# Patient Record
Sex: FEMALE | Race: WHITE
[De-identification: names, ages, dates, MRNs, and addresses within clinical notes are randomized per-mention and may not be internally consistent; named-entity substitution may affect disease eponyms.]

---

## 2017-11-28 ENCOUNTER — HOSPITAL ENCOUNTER (EMERGENCY)
Dept: HOSPITAL 25 - UCCORT | Age: 47
Discharge: HOME | End: 2017-11-28
Payer: COMMERCIAL

## 2017-11-28 VITALS — DIASTOLIC BLOOD PRESSURE: 86 MMHG | SYSTOLIC BLOOD PRESSURE: 120 MMHG

## 2017-11-28 DIAGNOSIS — J04.0: Primary | ICD-10-CM

## 2017-11-28 PROCEDURE — 99211 OFF/OP EST MAY X REQ PHY/QHP: CPT

## 2017-11-28 PROCEDURE — 87651 STREP A DNA AMP PROBE: CPT

## 2017-11-28 PROCEDURE — G0463 HOSPITAL OUTPT CLINIC VISIT: HCPCS

## 2017-11-28 NOTE — UC
Throat Pain/Nasal [place] HPI





- HPI Summary


HPI Summary: 





47 female presents to  with complaints of sore throat and hoarse voice x 2 

days. Patient also states she had had some nasal congestion, ear fullness and 

slight cough. Denies fever/chills, nausea and vomiting. Pain is worsened with 

swallowing. Denies sinus pressure and headache. Admits to feeling run down and 

fatigued. No trouble swallowing or difficulty breathing. No other complaints. 

No PMHx. Has not taken any medication. Has had symptoms like this before. 





- History of Current Complaint


Chief Complaint: UCGeneralIllness


Stated Complaint: ST


Time Seen by Provider: 11/28/17 17:37


Hx Obtained From: Patient


Hx Last Menstrual Period: 11/20/17


Onset/Duration: Sudden Onset, Lasting Days - 2, Still Present, Worse Since


Severity: Mild


Pain Intensity: 9


Pain Scale Used: 0-10 Numeric


Cough: None


Associated Signs & Symptoms: Positive: Dysphagia, Hoarseness





- Epiglottits Risk Factors


Epiglottis Risk Factors: Negative





- Allergies/Home Medications


Allergies/Adverse Reactions: 


 Allergies











Allergy/AdvReac Type Severity Reaction Status Date / Time


 


No Known Allergies Allergy   Verified 11/28/17 17:34














PMH/Surg Hx/FS Hx/Imm Hx





- Additional Past Medical History


Additional PMH: 





Denies PMHx. No DM or HTN





- Surgical History


Surgical History: Yes


Surgery Procedure, Year, and Place: 2 c-sections.  gallbladder





- Social History


Alcohol Use: None


Substance Use Type: None


Smoking Status (MU): Never Smoked Tobacco





- Immunization History


Most Recent Influenza Vaccination: none


Vaccination Up to Date: Yes





Review of Systems


Constitutional: Negative


ENT: Sore Throat, Ear Ache, Nasal Discharge


Respiratory: Cough - mild, dry non-productive


Cardiovascular: Negative


Gastrointestinal: Negative


Musculoskeletal: Negative


Neurological: Negative


All Other Systems Reviewed And Are Negative: Yes





Physical Exam


Triage Information Reviewed: Yes


Appearance: Well-Appearing, No Pain Distress


Vital Signs: 


 Initial Vital Signs











Temp  98.6 F   11/28/17 17:31


 


Pulse  81   11/28/17 17:31


 


Resp  16   11/28/17 17:31


 


BP  120/86   11/28/17 17:31


 


Pulse Ox  99   11/28/17 17:31











Vital Signs Reviewed: Yes


Eyes: Positive: Conjunctiva Clear


ENT: Positive: Hearing grossly normal, Pharyngeal erythema, TMs normal - serous 

effusion b/l, Hoarse voice, Uvula midline - no sign of peritonsillar abscess, 

airway patent.  Negative: Tonsillar swelling, Tonsillar exudate, Trismus, 

Muffled voice, Sinus tenderness


Dental: Negative: Percussion Tenderness @, Cervical Lymphadenopathy


Neck: Positive: Supple, Nontender, No Lymphadenopathy


Respiratory: Positive: Chest non-tender, Lungs clear, Normal breath sounds, No 

respiratory distress, No accessory muscle use.  Negative: Respiratory distress, 

Decreased breath sounds, Crackles, Rhonchi, Wheezing


Cardiovascular: Positive: RRR, No Murmur, Pulses Normal


Musculoskeletal: Positive: Strength Intact


Neurological: Positive: Alert


Skin Exam: Normal





Throat Pain/Nasal Course/Dx





- Course


Course Of Treatment: rapid strep obtained and negative. appears patient is 

suffering from a viral URI/ laryngitis due to HPI and PE findings. normal 

vitals. sympotmatic measures, rest voice, salt water gargles, chloraseptic 

spray. no other concerns at this time. aware of worsening signs and symptoms to 

watch out for. follow up with pcp. return if worsening.





- Differential Dx/Diagnosis


Differential Diagnosis/HQI/PQRI: Laryngitis, Mononucleosis, Pharyngitis, 

Tonsillitis, URI


Provider Diagnoses: laryngitis





Discharge





- Discharge Plan


Condition: Good


Disposition: HOME


Prescriptions: 


Fluticasone NASAL SPRAY 50MCG* [Flonase NASAL SPRAY 50MCG*] 2 spray BOTH NARES 

DAILY #1 btl


Patient Education Materials:  Laryngitis (ED)


Forms:  *Work Release


Referrals: 


Vivien Stiles MD [Primary Care Provider] - 


Additional Instructions: 


Use flonase to help with nasal congestion as prescribed.


Drink plenty of fluids and get plenty of rest.


Gargle with salt water multiple times daily while symptoms persist.


Claritin or Zyrtec to help with excessive mucus/fluid production


Recommend zicam or emergen-c sold over the counter to help boost immune system.


Chloraseptic spray to help numb back of throat.


Follow up with PCP.


Any new or worsening symptoms please seek medical attention, as we discussed.

## 2019-12-07 ENCOUNTER — HOSPITAL ENCOUNTER (EMERGENCY)
Dept: HOSPITAL 25 - UCCORT | Age: 49
Discharge: HOME | End: 2019-12-07
Payer: COMMERCIAL

## 2019-12-07 VITALS — SYSTOLIC BLOOD PRESSURE: 134 MMHG | DIASTOLIC BLOOD PRESSURE: 86 MMHG

## 2019-12-07 DIAGNOSIS — R19.5: Primary | ICD-10-CM

## 2019-12-07 PROCEDURE — 87177 OVA AND PARASITES SMEARS: CPT

## 2019-12-07 PROCEDURE — 87328 CRYPTOSPORIDIUM AG IA: CPT

## 2019-12-07 PROCEDURE — 87329 GIARDIA AG IA: CPT

## 2019-12-07 PROCEDURE — 99212 OFFICE O/P EST SF 10 MIN: CPT

## 2019-12-07 PROCEDURE — 87209 SMEAR COMPLEX STAIN: CPT

## 2019-12-07 PROCEDURE — G0463 HOSPITAL OUTPT CLINIC VISIT: HCPCS

## 2019-12-07 NOTE — UC
Abdominal Pain Female HPI





- HPI Summary


HPI Summary: 





Pt presents with c/o concern for tape worm found stool this morning.  Pt states 

she had BM this morning and noticed "stringy, pale" worm like substance in 

stool.  Pt has no known risk factor or exposure to tape worm but believes she 

has a tape worm in her "stomach" and in her right ear. 





- History of Current Complaint


Chief Complaint: UCGU


Stated Complaint: PERSONAL


Time Seen by Provider: 12/07/19 10:03


Hx Obtained From: Patient


Hx Last Menstrual Period: ablasion


Pregnant?: No


Onset/Duration: Sudden Onset


Timing: Intermittent Episodes Lasting: - with BM


Severity Initially: Mild


Severity Currently: Mild - in left ear


Pain Intensity: 0


Pain Scale Used: 0-10 Numeric


Radiates: No


Aggravating Factor(s): Other: - BM had possible tape worm


Alleviating Factor(s): Nothing


Associated Signs and Symptoms: Positive: Negative





- Risk Factors


Ectopic Pregnancy Risk Factor: Negative


Ovarian Torsion Risk Factor: Negative


Allergies/Adverse Reactions: 


 Allergies











Allergy/AdvReac Type Severity Reaction Status Date / Time


 


No Known Allergies Allergy   Verified 12/07/19 09:43














PMH/Surg Hx/FS Hx/Imm Hx


Previously Healthy: Yes


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





- Surgical History


Surgical History: Yes


Surgery Procedure, Year, and Place: 2 c-sections.  gallbladder





- Family History


Known Family History: Positive: Cardiac Disease





- Social History


Occupation: Employed Full-time


Lives: With Family


Alcohol Use: None


Substance Use Type: None


Smoking Status (MU): Never Smoked Tobacco


Have You Smoked in the Last Year: No





- Immunization History


Most Recent Influenza Vaccination: none


Vaccination Up to Date: Yes





Review of Systems


All Other Systems Reviewed And Are Negative: Yes


Constitutional: Positive: Negative


Skin: Positive: Negative


Eyes: Positive: Negative


ENT: Positive: Negative


Respiratory: Positive: Negative


Cardiovascular: Positive: Negative


Gastrointestinal: Positive: Other - possbile parasite in stool per pt


Genitourinary: Positive: Negative


Motor: Positive: Negative


Neurovascular: Positive: Negative


Musculoskeletal: Positive: Negative


Neurological: Positive: Negative


Psychological: Positive: Negative


Is Patient Immunocompromised?: No





Physical Exam





- Summary


Physical Exam Summary: 





Pt brought in stool sample.  Visual inspection did nto reveal any obvious 

parasite. I discussed my initial findings with pt and agreed to presumptively 

treat her for tapeworms. 


Triage Information Reviewed: Yes


Appearance: Well-Appearing


Vital Signs: 


 Initial Vital Signs











Temp  98.3 F   12/07/19 09:39


 


Pulse  88   12/07/19 09:39


 


Resp  18   12/07/19 09:39


 


BP  134/86   12/07/19 09:39


 


Pulse Ox  100   12/07/19 09:39











Vital Signs Reviewed: Yes


Eye Exam: Normal


ENT Exam: Normal


Dental Exam: Normal


Neck exam: Normal


Respiratory Exam: Normal


Respiratory: Positive: No respiratory distress


Cardiovascular Exam: Normal


Abdominal Exam: Normal


Abdomen Description: Positive: Nontender


Musculoskeletal Exam: Normal


Neurological Exam: Normal


Psychological Exam: Normal


Skin Exam: Normal





Abd Pain Female Course/Dx





- Differential Dx/Diagnosis


Differential Diagnosis: Diverticulitis, Irritable Bowel Syndrome


Provider Diagnosis: 


 Stool mucus








Discharge ED





- Sign-Out/Discharge


Documenting (check all that apply): Patient Departure


All imaging exams completed and their final reports reviewed: No Studies





- Discharge Plan


Condition: Stable


Disposition: HOME


Prescriptions: 


Praziquantel 600 mg PO ONCE #2 tablet


Patient Education Materials:  Tapeworm Infection (ED)


Referrals: 


Vivien Stiles MD [Primary Care Provider] - As Soon As Possible


Additional Instructions: 


We did not find any obvious evidence of a tapeworm infection.  We have sent a 

sample of your stool to our lab for further testing and evaluation.  Please 

note that this can take several days to a week to generate any results. We have 

presumptively treated you for your  presenting symptom.  





- Billing Disposition and Condition


Condition: STABLE


Disposition: Home